# Patient Record
Sex: MALE | Race: OTHER | Employment: FULL TIME | URBAN - METROPOLITAN AREA
[De-identification: names, ages, dates, MRNs, and addresses within clinical notes are randomized per-mention and may not be internally consistent; named-entity substitution may affect disease eponyms.]

---

## 2017-04-21 ENCOUNTER — APPOINTMENT (OUTPATIENT)
Dept: ULTRASOUND IMAGING | Age: 22
End: 2017-04-21
Payer: COMMERCIAL

## 2017-04-21 ENCOUNTER — HOSPITAL ENCOUNTER (EMERGENCY)
Age: 22
Discharge: HOME | End: 2017-04-21
Attending: EMERGENCY MEDICINE
Payer: COMMERCIAL

## 2017-04-21 VITALS
TEMPERATURE: 98.4 F | DIASTOLIC BLOOD PRESSURE: 74 MMHG | SYSTOLIC BLOOD PRESSURE: 118 MMHG | WEIGHT: 157 LBS | OXYGEN SATURATION: 100 % | HEART RATE: 70 BPM | BODY MASS INDEX: 21.98 KG/M2 | HEIGHT: 71 IN | RESPIRATION RATE: 15 BRPM

## 2017-04-21 DIAGNOSIS — N50.819 TESTICULAR DISCOMFORT: Primary | ICD-10-CM

## 2017-04-21 LAB
BILIRUBIN URINE: NEGATIVE
BLOOD, URINE: NEGATIVE
CLARITY: CLEAR
COLOR: YELLOW
GLUCOSE URINE: NEGATIVE MG/DL
KETONES, URINE: NEGATIVE MG/DL
LEUKOCYTE ESTERASE, URINE: NEGATIVE
NITRITE, URINE: NEGATIVE
PH UA: 6.5 (ref 5–9)
PROTEIN UA: NEGATIVE MG/DL
SPECIFIC GRAVITY UA: 1.02 (ref 1–1.03)
URINE REFLEX TO CULTURE: NORMAL
UROBILINOGEN, URINE: 1 E.U./DL

## 2017-04-21 PROCEDURE — 81003 URINALYSIS AUTO W/O SCOPE: CPT

## 2017-04-21 PROCEDURE — 76870 US EXAM SCROTUM: CPT

## 2017-04-21 PROCEDURE — 99283 EMERGENCY DEPT VISIT LOW MDM: CPT

## 2017-04-21 ASSESSMENT — ENCOUNTER SYMPTOMS
APNEA: 0
PHOTOPHOBIA: 0
ABDOMINAL DISTENTION: 0
ANAL BLEEDING: 0
VOICE CHANGE: 0
EYE DISCHARGE: 0

## 2017-04-21 ASSESSMENT — PAIN SCALES - GENERAL: PAINLEVEL_OUTOF10: 2

## 2017-04-21 ASSESSMENT — PAIN DESCRIPTION - LOCATION: LOCATION: SCROTUM

## 2017-04-21 ASSESSMENT — PAIN DESCRIPTION - PAIN TYPE: TYPE: ACUTE PAIN

## 2017-04-21 ASSESSMENT — PAIN DESCRIPTION - ORIENTATION: ORIENTATION: LEFT

## 2017-04-21 ASSESSMENT — PAIN DESCRIPTION - FREQUENCY: FREQUENCY: CONTINUOUS

## 2017-04-21 ASSESSMENT — PAIN DESCRIPTION - DESCRIPTORS: DESCRIPTORS: DISCOMFORT

## 2017-09-22 DIAGNOSIS — R68.89 FLU-LIKE SYMPTOMS: ICD-10-CM

## 2017-09-22 DIAGNOSIS — J02.9 EXUDATIVE PHARYNGITIS: ICD-10-CM

## 2017-09-24 LAB — THROAT CULTURE: NORMAL

## 2021-06-22 ENCOUNTER — APPOINTMENT (OUTPATIENT)
Dept: GENERAL RADIOLOGY | Age: 26
End: 2021-06-22

## 2021-06-22 ENCOUNTER — HOSPITAL ENCOUNTER (EMERGENCY)
Age: 26
Discharge: HOME | End: 2021-06-22
Attending: EMERGENCY MEDICINE

## 2021-06-22 ENCOUNTER — APPOINTMENT (OUTPATIENT)
Dept: GENERAL RADIOLOGY | Age: 26
End: 2021-06-22
Attending: EMERGENCY MEDICINE

## 2021-06-22 VITALS
OXYGEN SATURATION: 99 % | HEART RATE: 98 BPM | TEMPERATURE: 98.2 F | DIASTOLIC BLOOD PRESSURE: 111 MMHG | WEIGHT: 187.39 LBS | SYSTOLIC BLOOD PRESSURE: 126 MMHG | RESPIRATION RATE: 16 BRPM

## 2021-06-22 DIAGNOSIS — S43.015A ANTERIOR DISLOCATION OF LEFT SHOULDER, INITIAL ENCOUNTER: Primary | ICD-10-CM

## 2021-06-22 PROCEDURE — 96375 TX/PRO/DX INJ NEW DRUG ADDON: CPT

## 2021-06-22 PROCEDURE — 96376 TX/PRO/DX INJ SAME DRUG ADON: CPT

## 2021-06-22 PROCEDURE — 23650 CLTX SHO DSLC W/MNPJ WO ANES: CPT

## 2021-06-22 PROCEDURE — 96374 THER/PROPH/DIAG INJ IV PUSH: CPT

## 2021-06-22 PROCEDURE — 73020 X-RAY EXAM OF SHOULDER: CPT

## 2021-06-22 PROCEDURE — 99284 EMERGENCY DEPT VISIT MOD MDM: CPT | Performed by: STUDENT IN AN ORGANIZED HEALTH CARE EDUCATION/TRAINING PROGRAM

## 2021-06-22 PROCEDURE — 10002801 HB RX 250 W/O HCPCS

## 2021-06-22 PROCEDURE — 10002800 HB RX 250 W HCPCS: Performed by: STUDENT IN AN ORGANIZED HEALTH CARE EDUCATION/TRAINING PROGRAM

## 2021-06-22 PROCEDURE — 10002801 HB RX 250 W/O HCPCS: Performed by: STUDENT IN AN ORGANIZED HEALTH CARE EDUCATION/TRAINING PROGRAM

## 2021-06-22 PROCEDURE — 10002800 HB RX 250 W HCPCS

## 2021-06-22 PROCEDURE — 99284 EMERGENCY DEPT VISIT MOD MDM: CPT

## 2021-06-22 PROCEDURE — 23650 CLTX SHO DSLC W/MNPJ WO ANES: CPT | Performed by: STUDENT IN AN ORGANIZED HEALTH CARE EDUCATION/TRAINING PROGRAM

## 2021-06-22 PROCEDURE — 73030 X-RAY EXAM OF SHOULDER: CPT

## 2021-06-22 PROCEDURE — 10004281 HB COUNTER-STAFF TIME PER 15 MIN

## 2021-06-22 RX ORDER — KETAMINE HCL IN NACL, ISO-OSM 100MG/10ML
SYRINGE (ML) INJECTION
Status: COMPLETED
Start: 2021-06-22 | End: 2021-06-22

## 2021-06-22 RX ORDER — KETAMINE HCL IN NACL, ISO-OSM 100MG/10ML
0.3 SYRINGE (ML) INJECTION ONCE
Status: COMPLETED | OUTPATIENT
Start: 2021-06-22 | End: 2021-06-22

## 2021-06-22 RX ORDER — PROPOFOL 10 MG/ML
20 INJECTION, EMULSION INTRAVENOUS ONCE
Status: COMPLETED | OUTPATIENT
Start: 2021-06-22 | End: 2021-06-22

## 2021-06-22 RX ORDER — HYDROCODONE BITARTRATE AND ACETAMINOPHEN 5; 325 MG/1; MG/1
1 TABLET ORAL ONCE
Status: DISCONTINUED | OUTPATIENT
Start: 2021-06-22 | End: 2021-06-22 | Stop reason: CLARIF

## 2021-06-22 RX ORDER — LIDOCAINE HYDROCHLORIDE 10 MG/ML
INJECTION, SOLUTION EPIDURAL; INFILTRATION; INTRACAUDAL; PERINEURAL
Status: DISCONTINUED
Start: 2021-06-22 | End: 2021-06-23 | Stop reason: HOSPADM

## 2021-06-22 RX ORDER — PROPOFOL 10 MG/ML
INJECTION, EMULSION INTRAVENOUS
Status: COMPLETED
Start: 2021-06-22 | End: 2021-06-22

## 2021-06-22 RX ORDER — KETAMINE HCL IN NACL, ISO-OSM 100MG/10ML
40 SYRINGE (ML) INJECTION ONCE
Status: COMPLETED | OUTPATIENT
Start: 2021-06-22 | End: 2021-06-22

## 2021-06-22 RX ADMIN — PROPOFOL 20 MG: 10 INJECTION, EMULSION INTRAVENOUS at 21:45

## 2021-06-22 RX ADMIN — Medication 40 MG: at 21:45

## 2021-06-22 RX ADMIN — MORPHINE SULFATE 2 MG: 2 INJECTION, SOLUTION INTRAMUSCULAR; INTRAVENOUS at 21:41

## 2021-06-22 RX ADMIN — Medication 26 MG: at 21:07

## 2021-06-22 ASSESSMENT — ENCOUNTER SYMPTOMS
NAUSEA: 0
FATIGUE: 0
BACK PAIN: 0
CHEST TIGHTNESS: 0
FEVER: 0
BLOOD IN STOOL: 0
BRUISES/BLEEDS EASILY: 0
LIGHT-HEADEDNESS: 0
ABDOMINAL DISTENTION: 0
ABDOMINAL PAIN: 0
VOMITING: 0
WEAKNESS: 0
APPETITE CHANGE: 0
DIZZINESS: 0
NUMBNESS: 1
SORE THROAT: 0
RHINORRHEA: 0
COUGH: 0
CONSTIPATION: 0
ACTIVITY CHANGE: 0
CHILLS: 0
SHORTNESS OF BREATH: 0
HEADACHES: 0
WOUND: 0
EYE DISCHARGE: 0
WHEEZING: 0
SEIZURES: 0
SPEECH DIFFICULTY: 0
DIARRHEA: 0
DIAPHORESIS: 0
EYE PAIN: 0

## 2021-06-22 ASSESSMENT — PAIN SCALES - GENERAL: PAINLEVEL_OUTOF10: 10

## 2021-06-22 ASSESSMENT — VISUAL ACUITY: OU: 1

## 2023-01-29 PROBLEM — F41.9 ANXIETY AND DEPRESSION: Status: ACTIVE | Noted: 2023-01-29

## 2023-01-29 PROBLEM — S46.911A STRAIN OF RIGHT SHOULDER: Status: ACTIVE | Noted: 2023-01-29

## 2023-01-29 PROBLEM — J02.9 PHARYNGITIS, ACUTE: Status: ACTIVE | Noted: 2023-01-29

## 2023-01-29 PROBLEM — L64.9 MALE PATTERN ALOPECIA: Status: ACTIVE | Noted: 2023-01-29

## 2023-01-29 PROBLEM — B07.9 WART: Status: ACTIVE | Noted: 2023-01-29

## 2023-01-29 PROBLEM — F32.A ANXIETY AND DEPRESSION: Status: ACTIVE | Noted: 2023-01-29

## 2023-01-29 PROBLEM — A54.9 GONORRHEA: Status: ACTIVE | Noted: 2023-01-29

## 2023-01-29 PROBLEM — L73.9 FOLLICULITIS: Status: ACTIVE | Noted: 2023-01-29

## 2023-01-29 PROBLEM — R36.9 URETHRAL DISCHARGE IN MALE: Status: ACTIVE | Noted: 2023-01-29

## 2023-01-29 PROBLEM — R51.9 HEADACHE: Status: ACTIVE | Noted: 2023-01-29

## 2023-01-29 PROBLEM — M26.609 TMJ (TEMPOROMANDIBULAR JOINT DISORDER): Status: ACTIVE | Noted: 2023-01-29

## 2023-01-29 PROBLEM — K62.5 RECTAL BLEEDING: Status: ACTIVE | Noted: 2023-01-29

## 2023-01-29 RX ORDER — FINASTERIDE 1 MG/1
1 TABLET, FILM COATED ORAL DAILY
COMMUNITY
Start: 2022-09-07 | End: 2023-03-28

## 2023-01-29 RX ORDER — EMTRICITABINE AND TENOFOVIR ALAFENAMIDE 200; 25 MG/1; MG/1
1 TABLET ORAL DAILY
COMMUNITY
Start: 2020-03-12 | End: 2023-03-28 | Stop reason: SDUPTHER

## 2023-01-29 RX ORDER — BUSPIRONE HYDROCHLORIDE 7.5 MG/1
1 TABLET ORAL 2 TIMES DAILY
COMMUNITY
Start: 2020-03-12

## 2023-03-13 ENCOUNTER — APPOINTMENT (OUTPATIENT)
Dept: PRIMARY CARE | Facility: CLINIC | Age: 28
End: 2023-03-13
Payer: COMMERCIAL

## 2023-03-28 ENCOUNTER — LAB (OUTPATIENT)
Dept: LAB | Facility: LAB | Age: 28
End: 2023-03-28
Payer: COMMERCIAL

## 2023-03-28 ENCOUNTER — OFFICE VISIT (OUTPATIENT)
Dept: PRIMARY CARE | Facility: CLINIC | Age: 28
End: 2023-03-28
Payer: COMMERCIAL

## 2023-03-28 VITALS
SYSTOLIC BLOOD PRESSURE: 110 MMHG | WEIGHT: 192 LBS | RESPIRATION RATE: 16 BRPM | HEIGHT: 71 IN | BODY MASS INDEX: 26.88 KG/M2 | DIASTOLIC BLOOD PRESSURE: 75 MMHG | OXYGEN SATURATION: 98 % | HEART RATE: 56 BPM

## 2023-03-28 DIAGNOSIS — Z79.899 ON PRE-EXPOSURE PROPHYLAXIS FOR HIV: Primary | ICD-10-CM

## 2023-03-28 DIAGNOSIS — Z11.3 SCREENING FOR GONORRHEA: ICD-10-CM

## 2023-03-28 DIAGNOSIS — Z11.59 NEED FOR HEPATITIS B SCREENING TEST: ICD-10-CM

## 2023-03-28 DIAGNOSIS — Z11.3 ROUTINE SCREENING FOR STI (SEXUALLY TRANSMITTED INFECTION): ICD-10-CM

## 2023-03-28 DIAGNOSIS — Z11.59 NEED FOR HEPATITIS C SCREENING TEST: ICD-10-CM

## 2023-03-28 DIAGNOSIS — Z11.8 SCREENING FOR CHLAMYDIAL DISEASE: ICD-10-CM

## 2023-03-28 DIAGNOSIS — Z11.4 SCREENING FOR HIV (HUMAN IMMUNODEFICIENCY VIRUS): ICD-10-CM

## 2023-03-28 PROBLEM — R51.9 HEADACHE: Status: RESOLVED | Noted: 2023-01-29 | Resolved: 2023-03-28

## 2023-03-28 PROBLEM — R36.9 URETHRAL DISCHARGE IN MALE: Status: RESOLVED | Noted: 2023-01-29 | Resolved: 2023-03-28

## 2023-03-28 PROBLEM — B07.9 WART: Status: RESOLVED | Noted: 2023-01-29 | Resolved: 2023-03-28

## 2023-03-28 PROBLEM — L73.9 FOLLICULITIS: Status: RESOLVED | Noted: 2023-01-29 | Resolved: 2023-03-28

## 2023-03-28 PROBLEM — A54.9 GONORRHEA: Status: RESOLVED | Noted: 2023-01-29 | Resolved: 2023-03-28

## 2023-03-28 PROBLEM — J02.9 PHARYNGITIS, ACUTE: Status: RESOLVED | Noted: 2023-01-29 | Resolved: 2023-03-28

## 2023-03-28 LAB
HEPATITIS B VIRUS SURFACE AG PRESENCE IN SERUM: NONREACTIVE
HEPATITIS C VIRUS AB PRESENCE IN SERUM: NONREACTIVE
HIV 1/ 2 AG/AB SCREEN: NONREACTIVE
SYPHILIS TOTAL AB: NONREACTIVE

## 2023-03-28 PROCEDURE — 87340 HEPATITIS B SURFACE AG IA: CPT

## 2023-03-28 PROCEDURE — 87389 HIV-1 AG W/HIV-1&-2 AB AG IA: CPT

## 2023-03-28 PROCEDURE — 87491 CHLMYD TRACH DNA AMP PROBE: CPT

## 2023-03-28 PROCEDURE — 87591 N.GONORRHOEAE DNA AMP PROB: CPT

## 2023-03-28 PROCEDURE — 36415 COLL VENOUS BLD VENIPUNCTURE: CPT

## 2023-03-28 PROCEDURE — 86803 HEPATITIS C AB TEST: CPT

## 2023-03-28 PROCEDURE — 99214 OFFICE O/P EST MOD 30 MIN: CPT | Performed by: FAMILY MEDICINE

## 2023-03-28 PROCEDURE — 86780 TREPONEMA PALLIDUM: CPT

## 2023-03-28 RX ORDER — EMTRICITABINE AND TENOFOVIR ALAFENAMIDE 200; 25 MG/1; MG/1
1 TABLET ORAL DAILY
Qty: 30 TABLET | Refills: 3 | Status: SHIPPED | OUTPATIENT
Start: 2023-03-28 | End: 2023-08-03 | Stop reason: SDUPTHER

## 2023-03-28 ASSESSMENT — ENCOUNTER SYMPTOMS
SORE THROAT: 0
DYSURIA: 0
COUGH: 0
FEVER: 0

## 2023-03-28 NOTE — PROGRESS NOTES
"Subjective   Patient ID: Phil Fox is a 27 y.o. male who presents for STI Screening (3 month follow up for Descovy. No symptoms.).    HPI   He is here today for 3-month follow-up.  He is currently taking Descovy daily for HIV preexposure prophylaxis.  Compliant with taking he was recently exposed to a partner who had chlamydia infection in his throat approximately 1.5 months ago.  He went to an urgent care afterwards and tested negative for chlamydia  He denies any sore throat.  No fever or flulike symptoms.  No urethral discharge, testicular pain or rash.  Has had 2 partners since last visit  He has a history of anxiety and is currently taking buspirone 7.5 mg twice daily as needed.  He typically will take this medication 2 times every 2 weeks when his anxiety gets worse.  Gets mild drowsiness but otherwise is tolerating well.  He feels that the medication helps with things anxiety when taking  He is no longer taking Propecia      Review of Systems   Constitutional:  Negative for fever.   HENT:  Negative for sore throat.    Respiratory:  Negative for cough.    Genitourinary:  Negative for dysuria.       Objective   /75   Pulse 56   Resp 16   Ht 1.803 m (5' 11\")   Wt 87.1 kg (192 lb)   SpO2 98%   BMI 26.78 kg/m²     Physical Exam  Vitals reviewed.   Constitutional:       General: He is not in acute distress.     Appearance: Normal appearance. He is well-developed.   HENT:      Head: Normocephalic.      Mouth/Throat:      Pharynx: No oropharyngeal exudate or posterior oropharyngeal erythema.   Eyes:      Conjunctiva/sclera: Conjunctivae normal.   Cardiovascular:      Rate and Rhythm: Normal rate and regular rhythm.      Heart sounds: Normal heart sounds.   Pulmonary:      Effort: Pulmonary effort is normal.      Breath sounds: Normal breath sounds.   Lymphadenopathy:      Cervical: No cervical adenopathy.   Skin:     Findings: No rash.   Neurological:      Mental Status: He is alert. "   Psychiatric:         Mood and Affect: Mood normal.         Behavior: Behavior normal.         Assessment/Plan   Problem List Items Addressed This Visit    None  Visit Diagnoses       On pre-exposure prophylaxis for HIV    -  Primary    Relevant Medications    emtricitabine-tenofovir alafen (Descovy) 200-25 mg tablet    Screening for gonorrhea        Relevant Orders    C. Trachomatis / N. Gonorrhoeae, Amplified Detection    Routine screening for STI (sexually transmitted infection)        Relevant Orders    C. Trachomatis / N. Gonorrhoeae, Amplified Detection    Syphillis Screen, with reflex VDRL    Screening for chlamydial disease        Relevant Orders    C. Trachomatis / N. Gonorrhoeae, Amplified Detection    Screening for HIV (human immunodeficiency virus)        Relevant Orders    HIV 1/2 Antigen/Antibody Screen with Reflex to Confirmation    Need for hepatitis C screening test        Relevant Orders    Hepatitis C Antibody    Need for hepatitis B screening test        Relevant Orders    Hepatitis B Surface Antigen        #1 HIV preexposure prophylaxis: Doing well on Descovy, we will continue daily Descovy for HIV preexposure prophylaxis.  Last HIV test on 12/27/2022 was negative.  We will recheck HIV as well as a full STD screen.  Follow-up in 3 months for recheck  Anxiety: This has been adequately controlled on buspirone 7.5 mg twice daily as needed

## 2023-03-29 LAB
CHLAMYDIA TRACH., AMPLIFIED: NEGATIVE
N. GONORRHEA, AMPLIFIED: NEGATIVE

## 2023-08-03 DIAGNOSIS — Z79.899 ON PRE-EXPOSURE PROPHYLAXIS FOR HIV: ICD-10-CM

## 2023-08-03 RX ORDER — EMTRICITABINE AND TENOFOVIR ALAFENAMIDE 200; 25 MG/1; MG/1
1 TABLET ORAL DAILY
Qty: 30 TABLET | Refills: 0 | Status: SHIPPED | OUTPATIENT
Start: 2023-08-03 | End: 2023-09-05 | Stop reason: SDUPTHER

## 2023-08-03 NOTE — TELEPHONE ENCOUNTER
Rx Refill Request Telephone Encounter    Name:  Phil Fox  :  [id number]  Medication Name:  Descovy 200-25 mg tab   1 PO, daily   Specific Pharmacy location:  [facility] [place]    Last visit 5023  Next visit 2023

## 2023-08-23 ENCOUNTER — OFFICE VISIT (OUTPATIENT)
Dept: PRIMARY CARE | Facility: CLINIC | Age: 28
End: 2023-08-23
Payer: COMMERCIAL

## 2023-08-23 VITALS
WEIGHT: 198 LBS | SYSTOLIC BLOOD PRESSURE: 122 MMHG | HEIGHT: 71 IN | TEMPERATURE: 97.8 F | HEART RATE: 62 BPM | OXYGEN SATURATION: 98 % | DIASTOLIC BLOOD PRESSURE: 78 MMHG | BODY MASS INDEX: 27.72 KG/M2

## 2023-08-23 DIAGNOSIS — Z79.899 ON PRE-EXPOSURE PROPHYLAXIS FOR HIV: ICD-10-CM

## 2023-08-23 DIAGNOSIS — Z11.4 SCREENING FOR HIV (HUMAN IMMUNODEFICIENCY VIRUS): Primary | ICD-10-CM

## 2023-08-23 DIAGNOSIS — S93.509A SPRAIN OF TOE, INITIAL ENCOUNTER: ICD-10-CM

## 2023-08-23 DIAGNOSIS — Z11.3 ROUTINE SCREENING FOR STI (SEXUALLY TRANSMITTED INFECTION): ICD-10-CM

## 2023-08-23 DIAGNOSIS — F32.A ANXIETY AND DEPRESSION: ICD-10-CM

## 2023-08-23 DIAGNOSIS — F41.9 ANXIETY AND DEPRESSION: ICD-10-CM

## 2023-08-23 PROCEDURE — 1036F TOBACCO NON-USER: CPT | Performed by: FAMILY MEDICINE

## 2023-08-23 PROCEDURE — 99213 OFFICE O/P EST LOW 20 MIN: CPT | Performed by: FAMILY MEDICINE

## 2023-08-23 ASSESSMENT — ENCOUNTER SYMPTOMS
ARTHRALGIAS: 1
NERVOUS/ANXIOUS: 1
FEVER: 0
COUGH: 0

## 2023-08-23 NOTE — PROGRESS NOTES
"Subjective   Patient ID: Phil Fox is a 28 y.o. male who presents for Follow-up.    HPI     He is here today for follow-up  He currently takes Descovy for HIV preexposure prophylaxis.  He has had 1 new partner since last visit  He does mention that approximately 1 month ago he noticed a rash on his penis.  This was dry.  He applied a topical antifungal cream and the rash is almost completely resolved.  No pain.  No dysuria, testicular pain or urethral discharge  He currently takes buspirone 7.5 mg twice daily as needed for anxiety.  He will typically take this medication 1-2 times per week and feels that it works well.  No adverse effects of medication.  No significant anxiety when taking  He did injure his right first toe approximately 3 weeks ago.  He was at the pool and slipped on wet concrete and caught himself and injured his toe.  He is still having pain in the area but has been improving.  Pain is 4 out of 10 intensity.  This is most noticeable when he flexes his toe.  He is able to walk and run without much pain.  The area was bruised but has been improving.  No problems in the past with this toe.  He estimates that the pain is greater than 50% improved        Review of Systems   Constitutional:  Negative for fever.   Respiratory:  Negative for cough.    Genitourinary:  Negative for penile discharge.   Musculoskeletal:  Positive for arthralgias.   Skin:  Positive for rash.   Psychiatric/Behavioral:  The patient is nervous/anxious.        Objective   /78   Pulse 62   Temp 36.6 °C (97.8 °F) (Temporal)   Ht 1.803 m (5' 11\")   Wt 89.8 kg (198 lb)   SpO2 98%   BMI 27.62 kg/m²     Physical Exam  Vitals reviewed.   Constitutional:       General: He is not in acute distress.     Appearance: Normal appearance. He is well-developed.   HENT:      Head: Normocephalic.   Eyes:      Conjunctiva/sclera: Conjunctivae normal.   Cardiovascular:      Rate and Rhythm: Normal rate and regular rhythm.      " Heart sounds: Normal heart sounds.   Pulmonary:      Effort: Pulmonary effort is normal.      Breath sounds: Normal breath sounds.   Musculoskeletal:         General: Tenderness present.      Comments: Right first toe: Mild tenderness at the interphalangeal joint.  Increased pain with flexion.  There is no swelling or significant bruising present.  No first MTP joint tenderness   Skin:     Findings: Rash present.      Comments: There is a very faint area of skin dryness located dorsal aspect of the penile shaft just proximal to penile head.  There is no ulcer noted   Neurological:      Mental Status: He is alert.   Psychiatric:         Mood and Affect: Mood normal.         Behavior: Behavior normal.         Assessment/Plan   Problem List Items Addressed This Visit          Medium    Anxiety and depression    On pre-exposure prophylaxis for HIV     Other Visit Diagnoses       Screening for HIV (human immunodeficiency virus)    -  Primary    Relevant Orders    HIV 1/2 Antigen/Antibody Screen with Reflex to Confirmation    Sprain of toe, initial encounter        Routine screening for STI (sexually transmitted infection)        Relevant Orders    Syphilis Screen with Reflex          #1 anxiety: This is adequately controlled on buspirone as needed.  Continue at current dose  2.  HIV preexposure prophylaxis.  This is stable on Descovy.  His last HIV screen done 3/28/2023 was negative.  We will continue current dose and check HIV test  The rash that he noticed on his penis has been improving.  On exam he has a patch of skin dryness but no ulcer noted.  Do not have a high suspicion for any sexually transmitted infection, however we will add a syphilis screen to his labs to help rule this out.  Follow-up if this does not resolve completely over the next few weeks  3.  Toe strain, involving first toe right foot: This has been improving and he is able to ambulate without any difficulty.  Recommend calling or follow-up in the  next 2 to 4 weeks if this does not resolve completely  If otherwise doing well follow-up in 3 months for recheck

## 2023-08-28 ENCOUNTER — LAB (OUTPATIENT)
Dept: LAB | Facility: LAB | Age: 28
End: 2023-08-28
Payer: COMMERCIAL

## 2023-08-28 DIAGNOSIS — Z11.4 SCREENING FOR HIV (HUMAN IMMUNODEFICIENCY VIRUS): ICD-10-CM

## 2023-08-28 DIAGNOSIS — Z11.3 ROUTINE SCREENING FOR STI (SEXUALLY TRANSMITTED INFECTION): ICD-10-CM

## 2023-08-28 PROCEDURE — 87389 HIV-1 AG W/HIV-1&-2 AB AG IA: CPT

## 2023-08-28 PROCEDURE — 86780 TREPONEMA PALLIDUM: CPT

## 2023-08-28 PROCEDURE — 36415 COLL VENOUS BLD VENIPUNCTURE: CPT

## 2023-08-29 LAB
HIV 1/ 2 AG/AB SCREEN: NONREACTIVE
SYPHILIS TOTAL AB: NONREACTIVE

## 2023-09-05 DIAGNOSIS — Z79.899 ON PRE-EXPOSURE PROPHYLAXIS FOR HIV: ICD-10-CM

## 2023-09-05 RX ORDER — EMTRICITABINE AND TENOFOVIR ALAFENAMIDE 200; 25 MG/1; MG/1
1 TABLET ORAL DAILY
Qty: 30 TABLET | Refills: 3 | Status: SHIPPED | OUTPATIENT
Start: 2023-09-05 | End: 2024-01-04 | Stop reason: SDUPTHER

## 2023-09-14 ENCOUNTER — TELEPHONE (OUTPATIENT)
Dept: PRIMARY CARE | Facility: CLINIC | Age: 28
End: 2023-09-14
Payer: COMMERCIAL

## 2023-09-14 NOTE — TELEPHONE ENCOUNTER
Pt called states Lotrimin cream not working for penis area. Would you recommend anything else? Please advise.

## 2023-09-19 ENCOUNTER — OFFICE VISIT (OUTPATIENT)
Dept: PRIMARY CARE | Facility: CLINIC | Age: 28
End: 2023-09-19
Payer: COMMERCIAL

## 2023-09-19 VITALS
DIASTOLIC BLOOD PRESSURE: 66 MMHG | WEIGHT: 192 LBS | RESPIRATION RATE: 16 BRPM | HEART RATE: 67 BPM | OXYGEN SATURATION: 95 % | SYSTOLIC BLOOD PRESSURE: 108 MMHG | TEMPERATURE: 98.6 F | BODY MASS INDEX: 26.78 KG/M2

## 2023-09-19 DIAGNOSIS — R21 RASH: Primary | ICD-10-CM

## 2023-09-19 DIAGNOSIS — Z00.00 ROUTINE HEALTH MAINTENANCE: ICD-10-CM

## 2023-09-19 PROCEDURE — 99213 OFFICE O/P EST LOW 20 MIN: CPT | Performed by: FAMILY MEDICINE

## 2023-09-19 PROCEDURE — 1036F TOBACCO NON-USER: CPT | Performed by: FAMILY MEDICINE

## 2023-09-19 RX ORDER — MUPIROCIN 20 MG/G
1 OINTMENT TOPICAL 3 TIMES DAILY
Qty: 22 G | Refills: 0 | Status: SHIPPED | OUTPATIENT
Start: 2023-09-19 | End: 2023-09-26

## 2023-09-19 RX ORDER — NALOXONE HYDROCHLORIDE 4 MG/.1ML
4 SPRAY NASAL AS NEEDED
Qty: 2 EACH | Refills: 2 | Status: SHIPPED | OUTPATIENT
Start: 2023-09-19 | End: 2024-01-30

## 2023-09-19 ASSESSMENT — ENCOUNTER SYMPTOMS
FEVER: 0
VOMITING: 0
SHORTNESS OF BREATH: 0
COUGH: 0
NAIL CHANGES: 0
FATIGUE: 0

## 2023-09-19 NOTE — PROGRESS NOTES
Subjective   Patient ID: Phil Fox is a 28 y.o. male who presents for Rash (For 1 month now. Denies any itching ).    Rash  This is a new problem. The current episode started more than 1 month ago. The affected locations include the groin. Pertinent negatives include no cough, fatigue, fever, joint pain, nail changes, shortness of breath or vomiting.      He is here today for follow-up on rash  He has had a rash on his penis which has overall been present for approximately 2 months.  He has been applying topical Lotrimin without much improvement.  Rash has been unchanged over the last several weeks.  No pain or itch.  No testicular pain, or dysuria.  He recently had negative HIV and RPR on labs done last month  He is also requesting a prescription for Narcan nasal spray.  He does not use any illicit drugs, however he is around one of his exes a lot who has overdosed in the past.  He is requesting a prescription for Narcan to keep on hand in case he ever needs to use it on his ex    Review of Systems   Constitutional:  Negative for fatigue and fever.   Respiratory:  Negative for cough and shortness of breath.    Gastrointestinal:  Negative for vomiting.   Musculoskeletal:  Negative for joint pain.   Skin:  Positive for rash. Negative for nail changes.       Objective   /66   Pulse 67   Temp 37 °C (98.6 °F)   Resp 16   Wt 87.1 kg (192 lb)   SpO2 95%   BMI 26.78 kg/m²     Physical Exam  Constitutional:       General: He is not in acute distress.     Appearance: Normal appearance. He is well-developed.   Pulmonary:      Effort: Pulmonary effort is normal.   Skin:     Findings: Rash present.      Comments: There is a nonerythematous, dry patch of skin located on dorsal aspect of penile shaft measuring 8 to 10 mm in diameter.  No pustules or vesicles or erythema present   Neurological:      Mental Status: He is alert.   Psychiatric:         Mood and Affect: Mood normal.         Behavior: Behavior  normal.           Assessment/Plan   Problem List Items Addressed This Visit    None  Visit Diagnoses       Rash    -  Primary    Relevant Medications    mupirocin (Bactroban) 2 % ointment    Routine health maintenance        Relevant Medications    naloxone (Narcan) 4 mg/0.1 mL nasal spray        He presents today with a rash present on the dorsal aspect of penile shaft which has now been present for approximately 2 months, and has not improved with Lotrimin.  On exam today this appears to be a dry patch of skin. We will treat with mupirocin ointment x1 to 2 weeks which should hopefully help the area heal and help prevent any infection.  If this does not resolve in the next 2 to 4 weeks recommended that he call us and we could discuss a referral to urology or dermatology at that time  I also did give him a prescription for Narcan nasal spray for him to keep on hand since he is around his ex who has a history of overdose in the past.  We briefly discussed this medication including when to use and discussed the importance of calling 911 immediately if there is ever any concern for overdose or intoxication.  He is aware that this medication is over-the-counter, and he can purchase this if not covered by insurance

## 2024-01-04 DIAGNOSIS — Z79.899 ON PRE-EXPOSURE PROPHYLAXIS FOR HIV: ICD-10-CM

## 2024-01-04 RX ORDER — EMTRICITABINE AND TENOFOVIR ALAFENAMIDE 200; 25 MG/1; MG/1
1 TABLET ORAL DAILY
Qty: 30 TABLET | Refills: 3 | Status: SHIPPED | OUTPATIENT
Start: 2024-01-04

## 2024-01-04 NOTE — TELEPHONE ENCOUNTER
Rx Refill Request Telephone Encounter    Name:  Phil Fox  :  [id number]  Medication Name:  emtricitabine-tenofovir alafen (Descovy) 200-25 mg tablet     Specific Pharmacy location:  [address], [place]  Date of last appointment:  na  Date of next appointment:  na  Best number to reach patient:  [phone]

## 2024-01-30 ENCOUNTER — OFFICE VISIT (OUTPATIENT)
Dept: PRIMARY CARE | Facility: CLINIC | Age: 29
End: 2024-01-30
Payer: COMMERCIAL

## 2024-01-30 VITALS
TEMPERATURE: 97.9 F | SYSTOLIC BLOOD PRESSURE: 115 MMHG | DIASTOLIC BLOOD PRESSURE: 76 MMHG | BODY MASS INDEX: 26.78 KG/M2 | WEIGHT: 192 LBS | OXYGEN SATURATION: 96 % | HEART RATE: 66 BPM

## 2024-01-30 DIAGNOSIS — Z11.4 SCREENING FOR HIV (HUMAN IMMUNODEFICIENCY VIRUS): ICD-10-CM

## 2024-01-30 DIAGNOSIS — Z79.899 ON PRE-EXPOSURE PROPHYLAXIS FOR HIV: ICD-10-CM

## 2024-01-30 DIAGNOSIS — F41.9 ANXIETY AND DEPRESSION: Primary | ICD-10-CM

## 2024-01-30 DIAGNOSIS — R21 RASH: ICD-10-CM

## 2024-01-30 DIAGNOSIS — F32.A ANXIETY AND DEPRESSION: Primary | ICD-10-CM

## 2024-01-30 PROCEDURE — 99213 OFFICE O/P EST LOW 20 MIN: CPT | Performed by: FAMILY MEDICINE

## 2024-01-30 PROCEDURE — 1036F TOBACCO NON-USER: CPT | Performed by: FAMILY MEDICINE

## 2024-01-30 NOTE — PROGRESS NOTES
Subjective   Patient ID: Phil Fox is a 28 y.o. male who presents for Rash.    Rash  This is a recurrent problem. The problem is unchanged.   He is here today for follow-up  Has history of anxiety currently taking buspirone 7.5 mg twice daily as needed.  He feels that anxiety has been adequately controlled.  He typically will only take the buspirone 1 time per week.  Main trigger for anxiety is stress.  No adverse effects of medication.  No significant anxiety symptoms otherwise.  No panic attacks, depression symptoms  Currently takes daily Descovy for PrEP.  Last HIV screen done 8/28/2023 was negative.  He has had 2 partners since last visit.  No concerns for any STD exposure.  Denies symptoms occluding dysuria, discharge, testicular pain, sore throat or flulike symptoms  He has had a rash on his penis which has been present since July.  This has been treated with topical medications including topical clotrimazole and mupirocin without improvement.  He is interested in seeing a dermatologist to discuss further      Review of Systems   Skin:  Positive for rash.       Objective   /76   Pulse 66   Temp 36.6 °C (97.9 °F) (Temporal)   Wt 87.1 kg (192 lb)   SpO2 96%   BMI 26.78 kg/m²     Physical Exam  Vitals reviewed.   Constitutional:       General: He is not in acute distress.     Appearance: Normal appearance. He is well-developed.   HENT:      Head: Normocephalic.   Eyes:      Conjunctiva/sclera: Conjunctivae normal.   Cardiovascular:      Rate and Rhythm: Normal rate and regular rhythm.      Heart sounds: Normal heart sounds.   Pulmonary:      Effort: Pulmonary effort is normal.      Breath sounds: Normal breath sounds.   Genitourinary:     Comments: declines to have the area examined today  Skin:     Findings: No rash.   Neurological:      Mental Status: He is alert.   Psychiatric:         Mood and Affect: Mood normal.         Behavior: Behavior normal.         Assessment/Plan   Problem List  Items Addressed This Visit    None  Visit Diagnoses       Rash    -  Primary    Relevant Orders    Referral to Dermatology    Screening for HIV (human immunodeficiency virus)        Relevant Orders    HIV 1/2 Antigen/Antibody Screen with Reflex to Confirmation        Anxiety has been adequately controlled on buspirone as needed, continue at current dose  HIV preexposure prophylaxis: Continue daily Descovy.  We will recheck HIV screening and follow-up in 3 months for recheck  We will refer him to establish with dermatology for persistent rash on penis

## 2024-04-18 ENCOUNTER — APPOINTMENT (OUTPATIENT)
Dept: DERMATOLOGY | Facility: CLINIC | Age: 29
End: 2024-04-18
Payer: COMMERCIAL

## 2024-08-05 ENCOUNTER — APPOINTMENT (OUTPATIENT)
Dept: PRIMARY CARE | Facility: CLINIC | Age: 29
End: 2024-08-05
Payer: COMMERCIAL

## 2024-09-26 ENCOUNTER — APPOINTMENT (OUTPATIENT)
Dept: PRIMARY CARE | Facility: CLINIC | Age: 29
End: 2024-09-26
Payer: COMMERCIAL

## 2025-08-12 ENCOUNTER — APPOINTMENT (OUTPATIENT)
Dept: PRIMARY CARE | Facility: CLINIC | Age: 30
End: 2025-08-12
Payer: COMMERCIAL

## 2025-08-12 VITALS
WEIGHT: 159 LBS | OXYGEN SATURATION: 95 % | HEART RATE: 54 BPM | DIASTOLIC BLOOD PRESSURE: 66 MMHG | BODY MASS INDEX: 22.18 KG/M2 | SYSTOLIC BLOOD PRESSURE: 103 MMHG | TEMPERATURE: 97.3 F

## 2025-08-12 DIAGNOSIS — Z11.59 NEED FOR HEPATITIS B SCREENING TEST: ICD-10-CM

## 2025-08-12 DIAGNOSIS — F41.9 ANXIETY AND DEPRESSION: Primary | ICD-10-CM

## 2025-08-12 DIAGNOSIS — Z11.3 ENCOUNTER FOR SCREENING EXAMINATION FOR SEXUALLY TRANSMITTED INFECTION: ICD-10-CM

## 2025-08-12 DIAGNOSIS — F32.A ANXIETY AND DEPRESSION: Primary | ICD-10-CM

## 2025-08-12 DIAGNOSIS — Z11.59 NEED FOR HEPATITIS C SCREENING TEST: ICD-10-CM

## 2025-08-12 DIAGNOSIS — Z11.3 SCREENING FOR STD (SEXUALLY TRANSMITTED DISEASE): ICD-10-CM

## 2025-08-12 DIAGNOSIS — Z11.4 SCREENING FOR HIV (HUMAN IMMUNODEFICIENCY VIRUS): ICD-10-CM

## 2025-08-12 DIAGNOSIS — Z79.899 ON PRE-EXPOSURE PROPHYLAXIS FOR HIV: ICD-10-CM

## 2025-08-12 PROCEDURE — 1036F TOBACCO NON-USER: CPT | Performed by: FAMILY MEDICINE

## 2025-08-12 PROCEDURE — 99214 OFFICE O/P EST MOD 30 MIN: CPT | Performed by: FAMILY MEDICINE

## 2025-08-12 RX ORDER — BUSPIRONE HYDROCHLORIDE 7.5 MG/1
7.5 TABLET ORAL 2 TIMES DAILY PRN
Qty: 60 TABLET | Refills: 3 | Status: SHIPPED | OUTPATIENT
Start: 2025-08-12

## 2025-08-12 SDOH — HEALTH STABILITY: PHYSICAL HEALTH: ON AVERAGE, HOW MANY MINUTES DO YOU ENGAGE IN EXERCISE AT THIS LEVEL?: 30 MIN

## 2025-08-12 SDOH — HEALTH STABILITY: PHYSICAL HEALTH: ON AVERAGE, HOW MANY DAYS PER WEEK DO YOU ENGAGE IN MODERATE TO STRENUOUS EXERCISE (LIKE A BRISK WALK)?: 3 DAYS

## 2025-08-12 ASSESSMENT — PATIENT HEALTH QUESTIONNAIRE - PHQ9
1. LITTLE INTEREST OR PLEASURE IN DOING THINGS: NOT AT ALL
SUM OF ALL RESPONSES TO PHQ9 QUESTIONS 1 & 2: 0
2. FEELING DOWN, DEPRESSED OR HOPELESS: NOT AT ALL

## 2025-08-12 ASSESSMENT — ENCOUNTER SYMPTOMS
COUGH: 0
FEVER: 0

## 2025-08-22 LAB
ANION GAP SERPL CALCULATED.4IONS-SCNC: 10 MMOL/L (CALC) (ref 7–17)
BUN SERPL-MCNC: 16 MG/DL (ref 7–25)
BUN/CREAT SERPL: NORMAL (CALC) (ref 6–22)
C TRACH RRNA SPEC QL NAA+PROBE: NOT DETECTED
CALCIUM SERPL-MCNC: 9.3 MG/DL (ref 8.6–10.3)
CHLORIDE SERPL-SCNC: 105 MMOL/L (ref 98–110)
CHOLEST SERPL-MCNC: 156 MG/DL
CHOLEST/HDLC SERPL: 2.2 (CALC)
CO2 SERPL-SCNC: 27 MMOL/L (ref 20–32)
CREAT SERPL-MCNC: 0.91 MG/DL (ref 0.6–1.26)
EGFRCR SERPLBLD CKD-EPI 2021: 115 ML/MIN/1.73M2
ERYTHROCYTE [DISTWIDTH] IN BLOOD BY AUTOMATED COUNT: 11.4 % (ref 11–15)
GLUCOSE SERPL-MCNC: 94 MG/DL (ref 65–99)
HBV SURFACE AG SERPL QL IA: NORMAL
HCT VFR BLD AUTO: 45.1 % (ref 38.5–50)
HCV AB SERPL QL IA: NORMAL
HDLC SERPL-MCNC: 70 MG/DL
HGB BLD-MCNC: 15.2 G/DL (ref 13.2–17.1)
HIV 1+2 AB+HIV1 P24 AG SERPL QL IA: NORMAL
HIV 1+2 AB+HIV1 P24 AG SERPL QL IA: NORMAL
LDLC SERPL CALC-MCNC: 73 MG/DL (CALC)
MCH RBC QN AUTO: 33.7 PG (ref 27–33)
MCHC RBC AUTO-ENTMCNC: 33.7 G/DL (ref 32–36)
MCV RBC AUTO: 100 FL (ref 80–100)
N GONORRHOEA RRNA SPEC QL NAA+PROBE: NOT DETECTED
NONHDLC SERPL-MCNC: 86 MG/DL (CALC)
PLATELET # BLD AUTO: 264 THOUSAND/UL (ref 140–400)
PMV BLD REES-ECKER: 9.4 FL (ref 7.5–12.5)
POTASSIUM SERPL-SCNC: 4.4 MMOL/L (ref 3.5–5.3)
QUEST GC CT AMPLIFIED (ALWAYS MESSAGE): NORMAL
RBC # BLD AUTO: 4.51 MILLION/UL (ref 4.2–5.8)
SODIUM SERPL-SCNC: 142 MMOL/L (ref 135–146)
T PALLIDUM AB SER QL IA: NEGATIVE
TRIGL SERPL-MCNC: 57 MG/DL
WBC # BLD AUTO: 6.1 THOUSAND/UL (ref 3.8–10.8)

## 2025-08-25 DIAGNOSIS — Z79.899 ON PRE-EXPOSURE PROPHYLAXIS FOR HIV: ICD-10-CM

## 2025-08-25 RX ORDER — EMTRICITABINE AND TENOFOVIR ALAFENAMIDE 200; 25 MG/1; MG/1
1 TABLET ORAL DAILY
Qty: 30 TABLET | Refills: 3 | Status: SHIPPED | OUTPATIENT
Start: 2025-08-25

## 2025-11-10 ENCOUNTER — APPOINTMENT (OUTPATIENT)
Dept: PRIMARY CARE | Facility: CLINIC | Age: 30
End: 2025-11-10
Payer: COMMERCIAL